# Patient Record
Sex: FEMALE | Race: WHITE | NOT HISPANIC OR LATINO | URBAN - METROPOLITAN AREA
[De-identification: names, ages, dates, MRNs, and addresses within clinical notes are randomized per-mention and may not be internally consistent; named-entity substitution may affect disease eponyms.]

---

## 2021-03-02 ENCOUNTER — OFFICE VISIT (OUTPATIENT)
Dept: URBAN - METROPOLITAN AREA CLINIC 96 | Facility: CLINIC | Age: 76
End: 2021-03-02
Payer: MEDICARE

## 2021-03-02 ENCOUNTER — TELEPHONE ENCOUNTER (OUTPATIENT)
Dept: URBAN - METROPOLITAN AREA CLINIC 92 | Facility: CLINIC | Age: 76
End: 2021-03-02

## 2021-03-02 DIAGNOSIS — K57.90 DIVERTICULOSIS: ICD-10-CM

## 2021-03-02 DIAGNOSIS — K86.2 PANCREAS CYST: ICD-10-CM

## 2021-03-02 DIAGNOSIS — K80.20 GALLSTONES: ICD-10-CM

## 2021-03-02 DIAGNOSIS — R10.32 LLQ PAIN: ICD-10-CM

## 2021-03-02 DIAGNOSIS — N94.89 PELVIC CONGESTION SYNDROME: ICD-10-CM

## 2021-03-02 PROBLEM — 39402007: Status: ACTIVE | Noted: 2021-03-02

## 2021-03-02 PROBLEM — 235919008: Status: ACTIVE | Noted: 2021-03-02

## 2021-03-02 PROBLEM — 397881000: Status: ACTIVE | Noted: 2021-03-02

## 2021-03-02 PROCEDURE — 99205 OFFICE O/P NEW HI 60 MIN: CPT | Performed by: INTERNAL MEDICINE

## 2021-03-02 RX ORDER — OLMESARTAN MEDOXOMIL AND HYDROCHLOROTHIAZIDE 20/12.5 20; 12.5 MG/1; MG/1
1 TABLET TABLET ORAL ONCE A DAY
Status: ACTIVE | COMMUNITY

## 2021-03-02 RX ORDER — GABAPENTIN 100 MG/1
1 CAPSULE CAPSULE ORAL ONCE A DAY
Status: ACTIVE | COMMUNITY

## 2021-03-02 NOTE — PHYSICAL EXAM CHEST:
no lesions,  no deformities,  no traumatic injuries,  no significant scars are present,  chest wall non-tender,  no masses present, breathing is unlabored without accessory muscle use,normal breath sounds no

## 2021-03-02 NOTE — HPI-OTHER HISTORIES
pcp dr. cammy antonio referred for LLQ pain--wonders if its her hip or her nerves. no change in bowel habits has had colonoscopy before, cannot tell me when but had a cologaurd 2 years ago that was normal for hte pain, pcp ordered a CT scan --no acute process, panc cysts? and left kidney hypodensity, also pelvic congestion syndrome follow up MRI ordered--> panc cysts, IPMNs? no bloodi ns tool no black stools.  thinsk her colonscopy was 10 yeras ago, normal; reluctant to repeat pain in her LLQ only when she lays down at night no pain in daytime took tramadol and it did not help but gabapentin does

## 2021-03-10 ENCOUNTER — TELEPHONE ENCOUNTER (OUTPATIENT)
Dept: URBAN - METROPOLITAN AREA CLINIC 98 | Facility: CLINIC | Age: 76
End: 2021-03-10

## 2022-08-01 ENCOUNTER — OFFICE VISIT (OUTPATIENT)
Dept: URBAN - METROPOLITAN AREA CLINIC 98 | Facility: CLINIC | Age: 77
End: 2022-08-01
Payer: MEDICARE

## 2022-08-01 ENCOUNTER — WEB ENCOUNTER (OUTPATIENT)
Dept: URBAN - METROPOLITAN AREA CLINIC 98 | Facility: CLINIC | Age: 77
End: 2022-08-01

## 2022-08-01 VITALS
WEIGHT: 117.2 LBS | DIASTOLIC BLOOD PRESSURE: 75 MMHG | TEMPERATURE: 97.2 F | BODY MASS INDEX: 20.01 KG/M2 | HEIGHT: 64 IN | SYSTOLIC BLOOD PRESSURE: 142 MMHG | HEART RATE: 75 BPM

## 2022-08-01 DIAGNOSIS — K86.2 PANCREATIC CYST: ICD-10-CM

## 2022-08-01 PROCEDURE — 99214 OFFICE O/P EST MOD 30 MIN: CPT | Performed by: INTERNAL MEDICINE

## 2022-08-01 RX ORDER — GABAPENTIN 100 MG/1
1 CAPSULE CAPSULE ORAL ONCE A DAY
Status: ON HOLD | COMMUNITY

## 2022-08-01 RX ORDER — OLMESARTAN MEDOXOMIL AND HYDROCHLOROTHIAZIDE 20/12.5 20; 12.5 MG/1; MG/1
1 TABLET TABLET ORAL ONCE A DAY
Status: ACTIVE | COMMUNITY

## 2022-08-01 NOTE — HPI-TODAY'S VISIT:
HEre to discuss an abnormal MRI scan of pancreas Had seen Dr Lorena Irvin last year  recommended (after discussion between Dr Irvin and Dr Humphries) EUS but pt was reluctant to proceed she has no symptoms  no pain  she has not had recent labs wants to run next steps  by Dr Martinez, such as getting updated labs or EUS

## 2022-08-01 NOTE — HPI-OTHER HISTORIES
July 2022  MRI PANCREAS WITHOUT AND WITH CONTRAST  CLINICAL HISTORY: Pancreatic cyst.  TECHNIQUE: MRI of the abdomen with attention to the pancreas was performed using a variety of multiplanar pulse sequences before and after administration of 10 ml of Gadavist gadolinium contrast. In addition an MR cholangiogram is performed using a variety of multiplanar pulse sequences including heavily T2 weighted imaging.  COMPARISON: MR abdomen January 18, 2022  FINDINGS: Lung bases: The included lung bases are clear. Liver: Few tiny cysts in segment 2 and 4A measuring up to 0.4 cm, unchanged.. No intrahepatic biliary ductal dilatation. Gallbladder and biliary system: The gallbladder is normal. The extrahepatic bile duct is not dilated. Spleen: Grossly normal and without mass. Pancreas: Relatively stable cystic lesion in the proximal pancreatic body measuring 2.1 x 0.8 cm, when accounting for differences in measurement slice technique. This may have some internal septations. There are no areas of internal enhancement or aggressive features.. No pancreatic duct dilatation. No peripancreatic fat stranding or fluid. There is no pancreas divisum. Adrenal glands: Normal. No focal nodule. Kidneys and Collecting System: Status post right nephrectomy. There is a stable 0.9 cm area of T2 hypointensity interpolar left kidney, which is difficult to visualize on precontrast imaging, but appears slightly hypointense in comparison to the remaining kidney. 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 Gastrointestinal tract: The visualized bowel in the upper abdomen is normal in appearance without obstruction. Peritoneum/Retroperitoneum: No adenopathy. No ascites. Abdominal wall: Normal. No abdominal wall hernia or fluid collection. Vascular: The visualized abdominal aorta and its major branches are normal. No aneurysm. Musculoskeletal: The bones are normal in marrow signal without evidence of edema.  IMPRESSION: 1.  Similar mildly complex cystic lesion in the proximal pancreatic body. This connects to the main pancreatic duct, but it is unclear whether this is part of it. This could represent a side branch IPMN or potentially a main branch IPMN.  Recommend MRI of the pancreas with MRCP in 6 months to reevaluate. 2.  Stable indeterminate lesion in the left kidney. Recommend attention on follow-up. 3.  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   CT OF CHEST WITH CONTRAST  HISTORY:Breast cancer. Follow-up nodule.   TECHNIQUE: Following the intravenous administration of 95 mL of Omnipaque,  contiguous transaxial images obtained from the thoracic inlet to the upper abdomen.    COMPARISON: January 18, 2022 This exam was performed utilizing automated exposure control as a radiation dose lowering technique. Graft FINDINGS: Lungs are clear. No significant mediastinal, axillary, or obvious hilar lymphadenopathy. No suspicious noncalcified pulmonary nodule. Heart is normal in size. No evidence of pericardial or pleural effusion. There is scarring within the right lung base. Status post right mastectomy. Bilateral breast implants. There are spondylotic changes of the thoracic spine.   IMPRESSION:  No acute abnormality. No nodules identified. Previously noted nodules are no longer apparent.

## 2022-08-03 ENCOUNTER — TELEPHONE ENCOUNTER (OUTPATIENT)
Dept: URBAN - METROPOLITAN AREA CLINIC 6 | Facility: CLINIC | Age: 77
End: 2022-08-03

## 2022-09-14 ENCOUNTER — TELEPHONE ENCOUNTER (OUTPATIENT)
Dept: URBAN - METROPOLITAN AREA CLINIC 92 | Facility: CLINIC | Age: 77
End: 2022-09-14

## 2022-09-14 ENCOUNTER — OFFICE VISIT (OUTPATIENT)
Dept: URBAN - METROPOLITAN AREA MEDICAL CENTER 28 | Facility: MEDICAL CENTER | Age: 77
End: 2022-09-14
Payer: MEDICARE

## 2022-09-14 DIAGNOSIS — K83.8 ACQUIRED DILATION OF BILE DUCT: ICD-10-CM

## 2022-09-14 DIAGNOSIS — K80.20 ASYMPTOMATIC CHOLELITHIASIS: ICD-10-CM

## 2022-09-14 DIAGNOSIS — R93.3 ABN FINDINGS-GI TRACT: ICD-10-CM

## 2022-09-14 DIAGNOSIS — K29.60 ADENOPAPILLOMATOSIS GASTRICA: ICD-10-CM

## 2022-09-14 DIAGNOSIS — K86.89 ACUTE PANCREATIC FLUID COLLECTION: ICD-10-CM

## 2022-09-14 PROCEDURE — 43242 EGD US FINE NEEDLE BX/ASPIR: CPT | Performed by: INTERNAL MEDICINE

## 2022-09-14 PROCEDURE — 43239 EGD BIOPSY SINGLE/MULTIPLE: CPT | Performed by: INTERNAL MEDICINE

## 2022-09-14 RX ORDER — GABAPENTIN 100 MG/1
1 CAPSULE CAPSULE ORAL ONCE A DAY
Status: ON HOLD | COMMUNITY

## 2022-09-14 RX ORDER — OLMESARTAN MEDOXOMIL AND HYDROCHLOROTHIAZIDE 20/12.5 20; 12.5 MG/1; MG/1
1 TABLET TABLET ORAL ONCE A DAY
Status: ACTIVE | COMMUNITY

## 2022-09-20 ENCOUNTER — TELEPHONE ENCOUNTER (OUTPATIENT)
Dept: URBAN - METROPOLITAN AREA CLINIC 92 | Facility: CLINIC | Age: 77
End: 2022-09-20

## 2022-10-04 ENCOUNTER — TELEPHONE ENCOUNTER (OUTPATIENT)
Dept: URBAN - METROPOLITAN AREA CLINIC 92 | Facility: CLINIC | Age: 77
End: 2022-10-04

## 2022-10-17 ENCOUNTER — TELEPHONE ENCOUNTER (OUTPATIENT)
Dept: URBAN - METROPOLITAN AREA CLINIC 92 | Facility: CLINIC | Age: 77
End: 2022-10-17

## 2022-10-17 ENCOUNTER — DASHBOARD ENCOUNTERS (OUTPATIENT)
Age: 77
End: 2022-10-17

## 2022-10-19 ENCOUNTER — CLAIMS CREATED FROM THE CLAIM WINDOW (OUTPATIENT)
Dept: URBAN - METROPOLITAN AREA TELEHEALTH 2 | Facility: TELEHEALTH | Age: 77
End: 2022-10-19
Payer: MEDICARE

## 2022-10-19 VITALS — HEIGHT: 64 IN

## 2022-10-19 DIAGNOSIS — K80.20 CALCULUS OF GALLBLADDER WITHOUT CHOLECYSTITIS WITHOUT OBSTRUCTION: ICD-10-CM

## 2022-10-19 DIAGNOSIS — K86.2 PANCREATIC CYST: ICD-10-CM

## 2022-10-19 PROCEDURE — 99213 OFFICE O/P EST LOW 20 MIN: CPT

## 2022-10-19 PROCEDURE — 99442 PHONE E/M BY PHYS 11-20 MIN: CPT

## 2022-10-19 RX ORDER — GABAPENTIN 100 MG/1
1 CAPSULE CAPSULE ORAL ONCE A DAY
COMMUNITY

## 2022-10-19 RX ORDER — OLMESARTAN MEDOXOMIL AND HYDROCHLOROTHIAZIDE 20/12.5 20; 12.5 MG/1; MG/1
1 TABLET TABLET ORAL ONCE A DAY
COMMUNITY

## 2022-10-19 NOTE — HPI-TODAY'S VISIT:
Patient is a 77-year-old female who presents to follow-up from recent EUS.  EUS completed on 9/14/2022 with Dr. Humphries for pancreatic cyst identified on MRI.  Findings included normal-appearing esophagus.  Nodular mucosa in the prepyloric region of the stomach.  Normal-appearing duodenum.   2 cystic lesions in the pancreatic body. Largest lesion measured 17mm by 7 mm.  Endosonographic appearance is highly suspicious for a branched intraductal papillary mucinous neoplasm. Fine-needle aspiration was performed.  2 stones were visualized endosonographically in the gallbladder body.  There was dilation in the common bile duct which measured up to 7 mm there was no sign of significant pathology in the main pancreatic duct.   Pathology report revealed pancreatic body cyst aspiration which was negative for malignancy. CEA found to be 47 and amylase 287,600. Molecular pahtology results did not reveal significant molecular alterations increasing risk for malignancy. 97% probability remains benign over the next 3 years.  Pathology results from prepyloric biopsy revealed enteric type mucosa with chronic inactive inflammation and goblet cells. Negative for dysplasia or malignancy  She feels well at this time and denies pain with eating Denies N/V Denies unintentional weight loss Denies family history of pancreatic cancer

## 2022-10-24 PROBLEM — 70342003: Status: ACTIVE | Noted: 2022-10-24

## 2025-01-21 ENCOUNTER — TELEPHONE ENCOUNTER (OUTPATIENT)
Dept: URBAN - METROPOLITAN AREA CLINIC 98 | Facility: CLINIC | Age: 80
End: 2025-01-21

## 2025-02-06 ENCOUNTER — OFFICE VISIT (OUTPATIENT)
Dept: URBAN - METROPOLITAN AREA CLINIC 98 | Facility: CLINIC | Age: 80
End: 2025-02-06
Payer: MEDICARE

## 2025-02-06 ENCOUNTER — TELEPHONE ENCOUNTER (OUTPATIENT)
Dept: URBAN - METROPOLITAN AREA CLINIC 3 | Facility: CLINIC | Age: 80
End: 2025-02-06

## 2025-02-06 ENCOUNTER — LAB OUTSIDE AN ENCOUNTER (OUTPATIENT)
Dept: URBAN - METROPOLITAN AREA CLINIC 98 | Facility: CLINIC | Age: 80
End: 2025-02-06

## 2025-02-06 VITALS
DIASTOLIC BLOOD PRESSURE: 82 MMHG | HEIGHT: 64 IN | BODY MASS INDEX: 20.42 KG/M2 | WEIGHT: 119.6 LBS | TEMPERATURE: 97.1 F | SYSTOLIC BLOOD PRESSURE: 152 MMHG | HEART RATE: 75 BPM

## 2025-02-06 DIAGNOSIS — K86.2 PANCREATIC CYST: ICD-10-CM

## 2025-02-06 PROCEDURE — 99213 OFFICE O/P EST LOW 20 MIN: CPT

## 2025-02-06 RX ORDER — OLMESARTAN MEDOXOMIL AND HYDROCHLOROTHIAZIDE 20; 12.5 MG/1; MG/1
1 TABLET TABLET ORAL ONCE A DAY
Status: ACTIVE | COMMUNITY

## 2025-02-06 RX ORDER — GABAPENTIN 100 MG/1
1 CAPSULE CAPSULE ORAL ONCE A DAY
Status: ON HOLD | COMMUNITY

## 2025-02-06 NOTE — HPI-TODAY'S VISIT:
10/19/22- JACQUES Ghotra Patient is a 77-year-old female who presents to follow-up from recent EUS.  EUS completed on 9/14/2022 with Dr. Humphries for pancreatic cyst identified on MRI.  Findings included normal-appearing esophagus.  Nodular mucosa in the prepyloric region of the stomach.  Normal-appearing duodenum.   2 cystic lesions in the pancreatic body. Largest lesion measured 17mm by 7 mm.  Endosonographic appearance is highly suspicious for a branched intraductal papillary mucinous neoplasm. Fine-needle aspiration was performed.  2 stones were visualized endosonographically in the gallbladder body.  There was dilation in the common bile duct which measured up to 7 mm there was no sign of significant pathology in the main pancreatic duct.   Pathology report revealed pancreatic body cyst aspiration which was negative for malignancy. CEA found to be 47 and amylase 287,600. Molecular pahtology results did not reveal significant molecular alterations increasing risk for malignancy. 97% probability remains benign over the next 3 years.  Pathology results from prepyloric biopsy revealed enteric type mucosa with chronic inactive inflammation and goblet cells. Negative for dysplasia or malignancy  She feels well at this time and denies pain with eating Denies N/V Denies unintentional weight loss Denies family history of pancreatic cancer  2/6/25- Edel Gallagher NP - 80 yo female here to follow-up pancreatic cyst increasing in size - PCP Dr. Selene Desai - Last EUS 10/19/22 - Recent MRI - Denies N/V, epigastric pain, weight loss

## 2025-02-12 ENCOUNTER — OFFICE VISIT (OUTPATIENT)
Dept: URBAN - METROPOLITAN AREA MEDICAL CENTER 28 | Facility: MEDICAL CENTER | Age: 80
End: 2025-02-12
Payer: MEDICARE

## 2025-02-12 ENCOUNTER — TELEPHONE ENCOUNTER (OUTPATIENT)
Dept: URBAN - METROPOLITAN AREA CLINIC 98 | Facility: CLINIC | Age: 80
End: 2025-02-12

## 2025-02-12 DIAGNOSIS — K86.89 OTHER SPECIFIED DISEASES OF PANCREAS: ICD-10-CM

## 2025-02-12 DIAGNOSIS — R93.3 ABN FINDINGS-GI TRACT: ICD-10-CM

## 2025-02-12 DIAGNOSIS — K80.20 CALCULUS OF GALLBLADDER WITHOUT CHOLECYSTITIS WITHOUT OBSTRUCTION: ICD-10-CM

## 2025-02-12 PROCEDURE — 43238 EGD US FINE NEEDLE BX/ASPIR: CPT | Performed by: INTERNAL MEDICINE

## 2025-02-12 RX ORDER — GABAPENTIN 100 MG/1
1 CAPSULE CAPSULE ORAL ONCE A DAY
Status: ON HOLD | COMMUNITY

## 2025-02-12 RX ORDER — OLMESARTAN MEDOXOMIL AND HYDROCHLOROTHIAZIDE 20; 12.5 MG/1; MG/1
1 TABLET TABLET ORAL ONCE A DAY
Status: ACTIVE | COMMUNITY

## 2025-02-25 ENCOUNTER — TELEPHONE ENCOUNTER (OUTPATIENT)
Dept: URBAN - METROPOLITAN AREA CLINIC 98 | Facility: CLINIC | Age: 80
End: 2025-02-25

## 2025-03-06 ENCOUNTER — OFFICE VISIT (OUTPATIENT)
Dept: URBAN - METROPOLITAN AREA CLINIC 98 | Facility: CLINIC | Age: 80
End: 2025-03-06
Payer: MEDICARE

## 2025-03-06 VITALS
BODY MASS INDEX: 20.52 KG/M2 | DIASTOLIC BLOOD PRESSURE: 66 MMHG | WEIGHT: 120.2 LBS | TEMPERATURE: 96.8 F | HEIGHT: 64 IN | SYSTOLIC BLOOD PRESSURE: 120 MMHG | HEART RATE: 76 BPM

## 2025-03-06 DIAGNOSIS — K86.2 PANCREATIC CYST: ICD-10-CM

## 2025-03-06 PROCEDURE — 99213 OFFICE O/P EST LOW 20 MIN: CPT

## 2025-03-06 RX ORDER — GABAPENTIN 100 MG/1
1 CAPSULE CAPSULE ORAL ONCE A DAY
Status: ON HOLD | COMMUNITY

## 2025-03-06 RX ORDER — OLMESARTAN MEDOXOMIL AND HYDROCHLOROTHIAZIDE 20; 12.5 MG/1; MG/1
1 TABLET TABLET ORAL ONCE A DAY
Status: ACTIVE | COMMUNITY

## 2025-03-06 NOTE — HPI-TODAY'S VISIT:
10/19/22- JACQUES Ghotra Patient is a 77-year-old female who presents to follow-up from recent EUS.  EUS completed on 9/14/2022 with Dr. Humphries for pancreatic cyst identified on MRI.  Findings included normal-appearing esophagus.  Nodular mucosa in the prepyloric region of the stomach.  Normal-appearing duodenum.   2 cystic lesions in the pancreatic body. Largest lesion measured 17mm by 7 mm.  Endosonographic appearance is highly suspicious for a branched intraductal papillary mucinous neoplasm. Fine-needle aspiration was performed.  2 stones were visualized endosonographically in the gallbladder body.  There was dilation in the common bile duct which measured up to 7 mm there was no sign of significant pathology in the main pancreatic duct.   Pathology report revealed pancreatic body cyst aspiration which was negative for malignancy. CEA found to be 47 and amylase 287,600. Molecular pahtology results did not reveal significant molecular alterations increasing risk for malignancy. 97% probability remains benign over the next 3 years.  Pathology results from prepyloric biopsy revealed enteric type mucosa with chronic inactive inflammation and goblet cells. Negative for dysplasia or malignancy  She feels well at this time and denies pain with eating Denies N/V Denies unintentional weight loss Denies family history of pancreatic cancer  2/6/25- Edel Gallagher NP - 80 yo female here to follow-up pancreatic cyst increasing in size - PCP Dr. Selene Desai - Last EUS 10/19/22 - Recent MRI - Denies N/V, epigastric pain, weight loss  3/6/25- Edel Gallagher NP - 80 yo female here to follow-up EUS pancreatic cyst - EUS completed 2/12/25 - Denies unintentional weight loss - No nausea/vomiting, epigastric pain Reviewed - MRI/MRCP 1/3/25- cyst pancreatic body 2.6 cm x 1.6 cm, previously (8/4/23) 1.2 x 0.8 x 1.2 cm. Pancreatic neck cyst 2.0 x 1.8 x 1.8 cm, previously (8/4/23) 6mm. - EUS ([facility]) 2/12/25- Normal esophagus, stomach and duodenum. Pancreatic cyst in genu of pancreas 18 mm by 17 mm communicates with PD. FNA completed.  Proximal pancreatic body cyst 27 mm x 16 mm, communicates with PD. FNA. - Pancreatic cyst in Neck: cytology- benign, CEA 76, Amylase 175,005, molecular behavior -benign - pancreatic cyst body: cytology- benign, CEA 12, Amylase 161,567- molecular behavior - benign
21-Aug-2024 09:56